# Patient Record
Sex: FEMALE | Race: WHITE | Employment: OTHER | URBAN - METROPOLITAN AREA
[De-identification: names, ages, dates, MRNs, and addresses within clinical notes are randomized per-mention and may not be internally consistent; named-entity substitution may affect disease eponyms.]

---

## 2017-04-12 PROBLEM — Z97.8 FOLEY CATHETER IN PLACE: Status: ACTIVE | Noted: 2017-04-12

## 2017-05-31 PROBLEM — R31.9 BLOOD IN THE URINE: Status: ACTIVE | Noted: 2017-05-07

## 2017-07-17 PROBLEM — R10.2 SUPRAPUBIC PRESSURE: Status: ACTIVE | Noted: 2017-07-17

## 2019-06-26 ENCOUNTER — ANESTHESIA EVENT (OUTPATIENT)
Dept: SURGERY | Age: 84
End: 2019-06-26
Payer: MEDICARE

## 2019-06-26 RX ORDER — CEPHALEXIN 500 MG/1
500 CAPSULE ORAL 2 TIMES DAILY
Status: ON HOLD | COMMUNITY
End: 2019-06-27 | Stop reason: ALTCHOICE

## 2019-06-26 NOTE — PERIOP NOTES
PAT - SURGICAL PRE-ADMISSION INSTRUCTIONS    NAME:  Wm Nguyen                                                          TODAY'S DATE:  6/26/2019    SURGERY DATE:  6/27/2019                                  SURGERY ARRIVAL TIME:   TBV    1. Do NOT eat or drink anything, including candy or gum, after MIDNIGHT on 6/26/19 , unless you have specific instructions from your Surgeon or Anesthesia Provider to do so. 2. No smoking on the day of surgery. 3. No alcohol 24 hours prior to the day of surgery. 4. No recreational drugs for one week prior to the day of surgery. 5. Leave all valuables, including money/purse, at home. 6. Remove all jewelry, nail polish, makeup (including mascara); no lotions, powders, deodorant, or perfume/cologne/after shave. 7. Glasses/Contact lenses and Dentures may be worn to the hospital.  They will be removed prior to surgery. 8. Call your doctor if symptoms of a cold or illness develop within 24 ours prior to surgery. 9. AN ADULT MUST DRIVE YOU HOME AFTER OUTPATIENT SURGERY. 10. If you are having an OUTPATIENT procedure, please make arrangements for a responsible adult to be with you for 24 hours after your surgery. 11. If you are admitted to the hospital, you will be assigned to a bed after surgery is complete. Normally a family member will not be able to see you until you are in your assigned bed. 15. Family is encouraged to accompany you to the hospital.  We ask visitors in the treatment area to be limited to ONE person at a time to ensure patient privacy. EXCEPTIONS WILL BE MADE AS NEEDED. 15. Children under 12 are discouraged from entering the treatment area and need to be supervised by an adult when in the waiting room. Special Instructions:    Bring inhaler. , Take these medications the morning of surgery with a sip of water:  METOPROLOL, LEVOTHYROXINE, PEPCID,, STOP anticoagulants AT LEAST 1 WEEK PRIOR to your surgery or, follow other MD instructions: XERALTO    Patient Prep:    shower with anti-bacterial soap    These surgical instructions were reviewed with PATIENT during the PAT PHONE CALL. Directions: On the morning of surgery, please go to the [facility]. Enter the building from the [facility] entrance, 1st floor (next to the Emergency Room entrance). Take the elevator to the 2nd floor. Sign in at the Registration Desk.     If you have any questions and/or concerns, please do not hesitate to call:  (Prior to the day of surgery)  [facility] unit:  [phone]  (Day of surgery)  [facility] unit:  [phone]

## 2019-06-27 ENCOUNTER — ANESTHESIA (OUTPATIENT)
Dept: SURGERY | Age: 84
End: 2019-06-27
Payer: MEDICARE

## 2019-06-27 ENCOUNTER — HOSPITAL ENCOUNTER (OUTPATIENT)
Age: 84
Setting detail: OUTPATIENT SURGERY
Discharge: HOME | End: 2019-06-27
Attending: UROLOGY | Admitting: UROLOGY
Payer: MEDICARE

## 2019-06-27 VITALS
HEART RATE: 66 BPM | OXYGEN SATURATION: 97 % | WEIGHT: 127.6 LBS | RESPIRATION RATE: 16 BRPM | HEIGHT: 62 IN | SYSTOLIC BLOOD PRESSURE: 170 MMHG | TEMPERATURE: 97.8 F | BODY MASS INDEX: 23.48 KG/M2 | DIASTOLIC BLOOD PRESSURE: 78 MMHG

## 2019-06-27 DIAGNOSIS — N32.81 OAB (OVERACTIVE BLADDER): Primary | ICD-10-CM

## 2019-06-27 PROCEDURE — 74011250636 HC RX REV CODE- 250/636: Performed by: NURSE ANESTHETIST, CERTIFIED REGISTERED

## 2019-06-27 PROCEDURE — 76060000031 HC ANESTHESIA FIRST 0.5 HR: Performed by: UROLOGY

## 2019-06-27 PROCEDURE — 74011250636 HC RX REV CODE- 250/636

## 2019-06-27 PROCEDURE — 77030018832 HC SOL IRR H20 ICUM -A: Performed by: UROLOGY

## 2019-06-27 PROCEDURE — 76210000022 HC REC RM PH II 1.5 TO 2 HR: Performed by: UROLOGY

## 2019-06-27 PROCEDURE — 77030034696 HC CATH URETH FOL 2W BARD -A: Performed by: UROLOGY

## 2019-06-27 PROCEDURE — 74011000272 HC RX REV CODE- 272: Performed by: UROLOGY

## 2019-06-27 PROCEDURE — 74011250636 HC RX REV CODE- 250/636: Performed by: UROLOGY

## 2019-06-27 PROCEDURE — 74011000250 HC RX REV CODE- 250: Performed by: UROLOGY

## 2019-06-27 PROCEDURE — 77030018823 HC SLV COMPR VENO -B: Performed by: UROLOGY

## 2019-06-27 PROCEDURE — 74011250637 HC RX REV CODE- 250/637: Performed by: NURSE ANESTHETIST, CERTIFIED REGISTERED

## 2019-06-27 PROCEDURE — 74011000258 HC RX REV CODE- 258: Performed by: UROLOGY

## 2019-06-27 PROCEDURE — 76210000063 HC OR PH I REC FIRST 0.5 HR: Performed by: UROLOGY

## 2019-06-27 PROCEDURE — 76010000159 HC OR TIME FIRST 0.5 HR INTENSV-TIER 1: Performed by: UROLOGY

## 2019-06-27 PROCEDURE — 74011000258 HC RX REV CODE- 258

## 2019-06-27 RX ORDER — FAMOTIDINE 20 MG/1
20 TABLET, FILM COATED ORAL ONCE
Status: COMPLETED | OUTPATIENT
Start: 2019-06-27 | End: 2019-06-27

## 2019-06-27 RX ORDER — DIPHENHYDRAMINE HYDROCHLORIDE 50 MG/ML
INJECTION, SOLUTION INTRAMUSCULAR; INTRAVENOUS
Status: COMPLETED
Start: 2019-06-27 | End: 2019-06-27

## 2019-06-27 RX ORDER — HYDROMORPHONE HYDROCHLORIDE 2 MG/ML
0.5 INJECTION, SOLUTION INTRAMUSCULAR; INTRAVENOUS; SUBCUTANEOUS
Status: DISCONTINUED | OUTPATIENT
Start: 2019-06-27 | End: 2019-06-28 | Stop reason: HOSPADM

## 2019-06-27 RX ORDER — DIPHENHYDRAMINE HYDROCHLORIDE 50 MG/ML
25 INJECTION, SOLUTION INTRAMUSCULAR; INTRAVENOUS ONCE
Status: COMPLETED | OUTPATIENT
Start: 2019-06-27 | End: 2019-06-27

## 2019-06-27 RX ORDER — SODIUM CHLORIDE, SODIUM LACTATE, POTASSIUM CHLORIDE, CALCIUM CHLORIDE 600; 310; 30; 20 MG/100ML; MG/100ML; MG/100ML; MG/100ML
25 INJECTION, SOLUTION INTRAVENOUS CONTINUOUS
Status: DISCONTINUED | OUTPATIENT
Start: 2019-06-27 | End: 2019-06-27 | Stop reason: HOSPADM

## 2019-06-27 RX ORDER — FENTANYL CITRATE 50 UG/ML
50 INJECTION, SOLUTION INTRAMUSCULAR; INTRAVENOUS AS NEEDED
Status: DISCONTINUED | OUTPATIENT
Start: 2019-06-27 | End: 2019-06-28 | Stop reason: HOSPADM

## 2019-06-27 RX ORDER — ONDANSETRON 2 MG/ML
4 INJECTION INTRAMUSCULAR; INTRAVENOUS ONCE
Status: DISCONTINUED | OUTPATIENT
Start: 2019-06-27 | End: 2019-06-27 | Stop reason: HOSPADM

## 2019-06-27 RX ORDER — LIDOCAINE HYDROCHLORIDE 20 MG/ML
INJECTION, SOLUTION EPIDURAL; INFILTRATION; INTRACAUDAL; PERINEURAL AS NEEDED
Status: DISCONTINUED | OUTPATIENT
Start: 2019-06-27 | End: 2019-06-27 | Stop reason: HOSPADM

## 2019-06-27 RX ORDER — PROPOFOL 10 MG/ML
INJECTION, EMULSION INTRAVENOUS AS NEEDED
Status: DISCONTINUED | OUTPATIENT
Start: 2019-06-27 | End: 2019-06-27 | Stop reason: HOSPADM

## 2019-06-27 RX ORDER — LIDOCAINE HYDROCHLORIDE 10 MG/ML
0.1 INJECTION, SOLUTION EPIDURAL; INFILTRATION; INTRACAUDAL; PERINEURAL AS NEEDED
Status: DISCONTINUED | OUTPATIENT
Start: 2019-06-27 | End: 2019-06-27 | Stop reason: HOSPADM

## 2019-06-27 RX ORDER — SODIUM CHLORIDE, SODIUM LACTATE, POTASSIUM CHLORIDE, CALCIUM CHLORIDE 600; 310; 30; 20 MG/100ML; MG/100ML; MG/100ML; MG/100ML
125 INJECTION, SOLUTION INTRAVENOUS CONTINUOUS
Status: DISCONTINUED | OUTPATIENT
Start: 2019-06-27 | End: 2019-06-28 | Stop reason: HOSPADM

## 2019-06-27 RX ADMIN — SODIUM CHLORIDE, SODIUM LACTATE, POTASSIUM CHLORIDE, AND CALCIUM CHLORIDE 25 ML/HR: 600; 310; 30; 20 INJECTION, SOLUTION INTRAVENOUS at 10:44

## 2019-06-27 RX ADMIN — SODIUM CHLORIDE 1000 MG: 900 INJECTION, SOLUTION INTRAVENOUS at 10:38

## 2019-06-27 RX ADMIN — FAMOTIDINE 20 MG: 20 TABLET, FILM COATED ORAL at 10:35

## 2019-06-27 RX ADMIN — SODIUM CHLORIDE, SODIUM LACTATE, POTASSIUM CHLORIDE, AND CALCIUM CHLORIDE 125 ML/HR: 600; 310; 30; 20 INJECTION, SOLUTION INTRAVENOUS at 12:06

## 2019-06-27 RX ADMIN — PROPOFOL 100 MG: 10 INJECTION, EMULSION INTRAVENOUS at 11:03

## 2019-06-27 RX ADMIN — DIPHENHYDRAMINE HYDROCHLORIDE 25 MG: 50 INJECTION, SOLUTION INTRAMUSCULAR; INTRAVENOUS at 12:06

## 2019-06-27 RX ADMIN — MEPERIDINE HYDROCHLORIDE 12.5 MG: 100 INJECTION, SOLUTION INTRAMUSCULAR; INTRAVENOUS; SUBCUTANEOUS at 12:17

## 2019-06-27 RX ADMIN — LIDOCAINE HYDROCHLORIDE 50 MG: 20 INJECTION, SOLUTION EPIDURAL; INFILTRATION; INTRACAUDAL; PERINEURAL at 11:03

## 2019-06-27 RX ADMIN — GENTAMICIN SULFATE 250.4 MG: 40 INJECTION, SOLUTION INTRAMUSCULAR; INTRAVENOUS at 11:00

## 2019-06-27 NOTE — H&P
Arcelia Mendez presents today for Cystoscopy Botox Injection 200IU  Consent secured, questions answered. Urine culture positive for E. Coli pansensitve  Vanco and Gentamicin preop OCTOR      .  She is a 80 y.o.  female with a history of UUI, refractory OAB, and PFD managed with Bladder Botox injections (last 1/29/19) and SPT.      She was just recently treated with Macrobid for E coli UTI on 4/4/19. Her most bothersome symptom today is leakage around the SPT. Denies flank pain, gross hematuria, dysuria, asymptomatic for infection. No f/c/n/v.   She does say she is having trouble with her tube draining     Prior History  3/14/19: Patient has noted leakage around SPT site despite Botox injections. She is frustrated as this will soak her clothing and a foul odor. She discontinued Sanctura 2/2 dry mouth. Recalls trying Myrbetriq 50mg but cannot remember if it provided benefit. Requesting home health to start performing SPT exchanges monthly. Denies flank pain, gross hematuria, dysuria and is asymptomatic for infection today. No f/c/n/v.      12/21/18: Patient here today c/o decreased urine output and urine leakage from her vagina. Continues Myrbetriq 50mg for her bladder spasms,however she is scheduled to have Botox injections. On UTI prophylaxis with Keflex 250mg daily and has irrigated 3 times since her last visit with Dr. Kadie Asher. She also has itching in her vagina today. No f/c/n/v.      Patient was unable tolerate cystoscopy to deliver Botox injections. SPT changed today. To OR for cysto and bladder capacity measurement under anesthesia; goal is to consider BN closure.      11/16/18: Patient is here for a SPT change, and reports increased leak w/ occasional gross hematuria. However, she began using a belly bag when she is active and this has improved her QOL significantly. She has finished her UTI prophylaxis. No s/s of UTI today. No n/v/f/fc.    Area of irritation and dermatomycosis on low abdomen has improved with Nystatin BID and fluconazole 150mg x3.      9/7/2018: Patient has been doing relatively well since last. No symptomatic UTIs. She c/o leak around SPT and a rash developing above site. She continues on Keflex prophylaxis and Myrbetriq 50mg. Bladder spasms under better control. No gross hematuria or flank pain. No n/v/f/c. Overall, doing well.      7/16/2018: Patient has been scheduled for bladder neck closure with martius flap and bladder Botox injections on 7/27/2018 due to persistent bothersome incontinence. However, today states that she is taking her Myrbetriq 50mg consistently and her incontinence per urethra has improved significantly. She is still having some leakage around SPT, but this is not as bothersome to her. Last month she was treated for a persistent UTI with two courses of ABX, and believes that this has also helped her bladder spasms. She is wanting to post-pone her surgery and observe her symptoms. No dysuria or gross hematuria. No n/v/f/c. Still does not like leg bag.      6/27/2018: Patient has been seen in office several times over the last few months with SPT issues, bladder spasms, recurrent UTIs, and constant incontinence. She has been scheduled for Cysto, bladder neck closure w/ martius flap, and Bladder Botox injections 200 units on 7/27/2018. But, it has been discussed that she will have the option to move forward with a urinary diversion if no benefit with less invasive surgery (BN closure). She is still unsure of what option will be better for her, and has several questions. She has been having severe bladder spasms this week, and is leaking constantly through urethra. Patient states that her QOL is very limited, and she can't leave the house without soaking her clothing. She was given Macrobid earlier this week for suspected UTI. No n/v/f/c.      6/12/2018: Patient was last seen for an SPT change on 6/4/2018.  Patient has had persistent incontinence around SPT site and urethra. She struggles with rashes around her abdomen, and she is unsure if this is macerated skin or allergies. She is considering alternative surgical options, and presents today with daughter to discuss. No s/s of UTI. Catheter is draining well, but she has noticed some blood clots in her leg bag. No n/v/f/c.      5/23/2018: Had Botox injection last on 3/30/18 and is pleased with results. Seen in office for irrigation on 5/16/18. Still experiencing leakage around SPT site and occasionally from urethra as well. Last changed on 5/7/18. Denies flank pain, gross hematuria, and is asymptomatic for infection today. No f/c/n/v.       3/30/18: Here today for 200u Botox injections. Pt was started on Levaquin 2 days ago for E coli UTI, but has been experiencing SE nausea. She was sent a prescription of Zofran by Dr. Rodney Diaz, however she has not taken it. Concerned that she may still have infection today. Has upcoming appointment with ID to discuss frequent UTI. No f/c/n/v.      3/2/2018: Patient was last treated for UTI in January. She had a \"horrible\" back ache, confusion, and malodorous urine. Symptoms have resolved following a course of Doxycyline. She is having some leakage around catheter and through urethra. No gross hematuria. No n/v/f/c.      11/29/2017: Patient was recently admitted to the hospital on 11/15/2017 for a CHF which was resolved with diuresis and e E faecalis CAUTI resolved by IV ABX. She was discharged home but was readmitted to hospital on 11/23/2017 for concern of pneumonia but was diagnosed with with acute bronchitis. Urine culture was negative during second admission. Her SPT is draining appropriately. Yellow urine in leg bag. Given recent CHF diagnosis she has been placed on lasix and has noticed increase incontinence around catheter at night. Continues  irrigations MWF.  No n/v/f/c.      5/31/2017: Here today s/p cysto, 200 units of bladder Botox injections, and SPT placement on 5/2/2017. Postoperative course complicated by gross hematuria requiring readmittance for CBI. She was also seen in the emergency room for concern of minor cellulitis around SPT site and was placed on Keflex. Patient continues to c/o persistent erythema around SPT site and states that she was missing doses of her ABX. She denies gross hematuria. No n/v/f/c.      2/17/2017: Presents today in follow up. She is s/p bladder Botox injections on 7/20/2016 and manages with a chronic Rodriguez catheter. She continues on Myrbetriq 50mg daily for bladder spasms, but feels that Botox and Myrbetriq have become less effective recently and she feels that it is time to schedule next course of Botox. She has been experiencing increased leakage around catheter. She is managing leakage well with pads, but bladder spasms are very painful. No hematuria. No n/v/f/c. Additionally mentions that she is no longer performing bladder irrigations MWF because her daughter has not been able to help her. Interested in [facility] if she qualifies.      11/18/2016: Presents today in follow up. She is s/p bladder Botox injections on 7/20/2016 and manages with a chronic Rodriguez catheter.  She continues on Myrbetriq 50 mg/ bladder spasms. She has been treated for 2 UTI's since last visit. With infections she experiences fransisco catheter leakage and cloudy urine. Cloudy urine improved after course of ABx, but still struggling with fransisco cath leakage. She would like to discuss UTI management. Denies hematuria, flank pain, or AMS.    8/29/2016:She is s/p bladder Botox injections on 7/20/2016. She continues management with Rodriguez catheter (last changed on 8/16/16) and is taking Toviaz 8mg daily. She reports intolerable dry mouth with Miri Carton would like to discuss Myrbetriq. Still has leakage per urethra around catheter with occasional bladder spasms, but improved with Botox. Reports 2 episodes. She is plugging Rodriguez and emptying bladder q 1 hour.  Currently begin treated for UTI. Last dose of Macrobid tonight. No n/v/f/c.      5/16/2016 Patient is considering Botox injecitons, however, she is unable to preform CIC 2/2 arthritis. Leakage is still very bothersome. Continues to wear 5-6 PPD. She is considering a permanent Rodriguez to control leakage, specifically, in social situations. Denies UTI sx today.      4/11/2016: Since ladst visit she has finished a trial of Myrbetriq 50mg with minimal benefit. She reports significant dry mouth and \"sores\". Her UUI is significantly effecting QOL. She is interested in more invasive therapies; mainly interested in bladder Botox injections. She has never performed CIC before but is willing to learn in event of retention. No dysuria, hematuria, or recent UTI. She did PTNS with Dr. Giovanny Betts in the past.   History of a-fib; on anticoagulation. Has been able to bridge for dental procedures in the past.      2/23/2016:Estephanie Merritt is a 80 y.o. female Miles Blakely presents for follow up of UUI. Patient has completed 6 sessions of PT. Her constipation issues resolved, however she has not experienced any improvement from a urinary stand point. Still uses up 6 pads a day which are completely soaked. She is no longer interested in continuing with physical therapy and would like to try something else.      11/24/2015:Estephanie Merritt is a 80 y.o. female Miles Blakely presents for follow up of severe UUI. On last visit she was given samples of myrbetriq, which she stopped because it raised her blood pressure. UDS examination revealed a low capacity compliant bladder with DO and unrelaxed sphincter. On PT she was found to have pelvic floor muscle dysfunction. She is still having frequent incontinence episodes, especially during the night, and complains of severe constipation.       11/2/2015: She has not been evaluated since 2013. Since last visit she has been seeing Dr. Giovanny Betts, urogynocology and recently underwent cystoscopy as part of a work up for interstim placement. Her cystoscopy was revealed highly trabeculated bladder and several cellules. She would like to discuss this today. She states that she \"never\" makes it to the bathroom with initial urge. She wears several heavy pads throughout the day and night. She has constant bladder pain with voiding. She voids with continuous, good FOS. Frequently experiences PVR sensation and will double void with crede or valsalva. She notes enuresis until she was a teenager and dyspareunia when she was sexually active. She has also h/o diverticulitis and has trouble with regulating bowel movements. She will have constipation and then experience diarrhea after taking laxitives. During episodes of diarrhea she will experience occasional fecal incontinence. Has failed PTNS in the past.      2013:Has finished PT and had significant improvement in symptoms. She had relief in suprapubic pressure and voids better. Happy with results. Here for follow up. Had vaginal itching with estrace, hence she discontinue.      2013:  history of frequency every 1-2 hours, urgency, UUI (wears a pad); occasional ROBERTO. Also complains of nocturia X 3-4. She complains of a suprapubic pressure that relieves after voiding. The symptoms have been present for about two months, but had episodes in the past. Intensity has progressed since onset. Episodes interpreted as UTI's and managed with different antibiotics; has tried 3 types with partial success in relieving symptoms. Good FOS, feels empty after finishing, no hesitancy. No dysuria, no hematuria. She drinks 80 ounces of water, one coffee in the morning and a soda at night. Irregular bowel movement, uses [employer] to help with constipation.    A1 (tubed ectopic pregnancy) vaginal delivery; no hysterectomy.             Current Outpatient Medications   Medication Sig Dispense Refill    Syringe, Disposable, (BD SYRINGE CATHETER TIP) 60 mL syrg 1 syringe PRN for bladder irriagtions 30 Syringe 11    ciprofloxacin HCl (CIPRO) 500 mg tablet Take 1 Tab by mouth two (2) times a day for 7 days. 14 Tab 0    PROAIR HFA 90 mcg/actuation inhaler inhale 2 puffs by mouth every 4 hours if needed for cough shortness of breath or wheezing   0    AEROCHAMBER MAX WITH FLOW-VU use as directed with INHALER   0    nitrofurantoin, macrocrystal-monohydrate, (MACROBID) 100 mg capsule Take 1 Cap by mouth two (2) times a day. 20 Cap 0    mirabegron ER (MYRBETRIQ) 50 mg ER tablet Take 1 Tab by mouth daily. 90 Tab 3    ondansetron (ZOFRAN ODT) 4 mg disintegrating tablet Take 1 Tab by mouth every eight (8) hours as needed for Nausea. 20 Tab 0    mirabegron ER (MYRBETRIQ) 50 mg ER tablet Take 1 Tab by mouth daily. 30 Tab 4    ondansetron hcl (ZOFRAN) 4 mg tablet Take 1 Tab by mouth every eight (8) hours as needed for Nausea. 15 Tab 0    clobetasol (TEMOVATE) 0.05 % ointment Apply  to affected area two (2) times a day. Apply to affected area 2x daily x 4 weeks. 15 g 2    metoprolol tartrate (LOPRESSOR) 100 mg IR tablet 100 mg.        nystatin (MYCOSTATIN) topical cream Apply  to affected area two (2) times a day. 15 g 0    atorvastatin (LIPITOR) 10 mg tablet          calcium-cholecalciferol, D3, (CALTRATE 600+D) tablet          sodium chloride 0.9 % soln 60 mL by IntraVESical route every other day. 5000 mL 2    metoprolol succinate (TOPROL-XL) 100 mg tablet     0    furosemide (LASIX) 20 mg tablet     0    famotidine (PEPCID) 20 mg tablet     0    KLOR-CON M10 10 mEq tablet          Syringe with Needle, Safety (EASY TOUCH FLIPLOCK SYRINGE) 3 mL 21 gauge x 1 1/2\" syrg 1 Syringe by Does Not Apply route every seven (7) days. 4 Pen Needle 12    Denosumab (PROLIA) 60 mg/mL injection 60 mg.        rivaroxaban (XARELTO) 15 mg tab tablet Take  by mouth daily.        levothyroxine (SYNTHROID) 25 mcg tablet Take  by mouth Daily (before breakfast).         ergocalciferol (VITAMIN D2) 50,000 unit capsule Take 50,000 Units by mouth.               Past Medical History:   Diagnosis Date    A-fib ([facility]      Arthritis      B12 deficiency      Bladder atony      Bladder spasms      Cardiac arrhythmia      Cardiac disorder      Cerebral artery occlusion with cerebral infarction (HCC)      Cerebral infarction (HCC)      Chronic suprapubic catheter (HCC)      Congestive heart failure (CHF) ([facility] 11/2017    Diverticulum of bladder      Esophageal reflux      Rodriguez catheter in place 4/12/2017    Frequency of micturition      Gross hematuria      Heart defect, congenital      Hematuria      HTN (hypertension)      Hyperlipemia      Hyperthyroidism      Hypothyroid      Incontinence of feces      Memory loss      Nocturia      OAB (overactive bladder)      Osteopenia      PFD (pelvic floor dysfunction)      Recurrent UTI      Stroke (HCC)      Suprapubic pressure      Urge incontinence      Urge incontinence of urine      Urinary retention      UTI (urinary tract infection)      Vaginal atrophy      Vitamin D deficiency        /psh        Allergies   Allergen Reactions    Latex Rash    Aspirin Other (comments)       Does not take because she is on other blood thinners    Ciprofloxacin Other (comments)       Nausea, vomiting, sores in mouth    Codeine Nausea and Vomiting       Patient can not remember    Penicillins Other (comments)    Pradaxa [Dabigatran Etexilate] Other (comments)       Breaks out sores    Sulfa (Sulfonamide Antibiotics) Nausea Only    Trospium Nausea and Vomiting and Other (comments)       Dry mouth and sores in the mouth         Social History            Tobacco Use    Smoking status: Never Smoker    Smokeless tobacco: Never Used   Substance Use Topics    Alcohol use: No       Comment: socially wine or beer    Drug use:  No            Family History   Problem Relation Age of Onset    Diabetes Mother     Tony Larson Arthritis-rheumatoid Sister      Diabetes Sister        Review of Systems  Constitutional: Fever: No  has chills  Skin: Rash: No  HEENT: Hearing difficulty: No  Eyes: Blurred vision: No  Cardiovascular: Chest pain: No  Respiratory: Shortness of breath: No  Gastrointestinal: Nausea/vomiting: No  Musculoskeletal: Back pain: Yes  Neurological: Weakness: Yes  Psychological: Memory loss: No  Comments/additional findings:         Visit Vitals  Ht 5' 3\" (1.6 m)   Wt 113 lb (51.3 kg)   BMI 20.02 kg/m²      Abdomen soft SP tube in place           Results for orders placed or performed in visit on 05/21/19   URINE C&S   Result Value Ref Range     FINAL REPORT Microbiology results (A)           Procedure:  A 10 cc syringe was attached to the old catheter port and drawn back to deflate the balloon. The catheter was removed. Using sterile technique lubricant was applied liberally to the first two inches of the new catheter tip and inserted gently to the same level as the previous catheter. The catheter was held and the balloon was inflated slowly with 10cc of sterile water . The syringe was removed from the catheter port. A new, clean drainage bag was attached. Patient is aware that there may be some slight bleeding from the tube site and that a small amount of bleeding is normal.   Patient was advised to contact our office if problems arise.      Catheter size:  16  Type:  Straight-tip   Material: Silicone     Urine was:   Dark and cloudy     Specimen was obtained:   Yes  Drainage bag:   bedside     Patient been set up with the [address] [place] [address] program:  YES     Patient did not bring their own catheter supplies.          Encounter Diagnoses   Name Primary?     Urinary retention Yes    Chronic suprapubic catheter ([address]                 Orders Placed This Encounter    SP Tube Change    URINE C&S       Order Specific Question:   Specify all ANTIBIOTIC ALLERGIES:       Answer:   Penicillin       Comments:   Per patient she is not allergic to Cipro       Order Specific Question:   Specify all ANTIBIOTIC ALLERGIES:       Answer:   Sulfa (Bactrim)       Order Specific Question:   Specify the urine source       Answer:   Suprapubic Tube    PROAIR HFA 90 mcg/actuation inhaler       Sig: inhale 2 puffs by mouth every 4 hours if needed for cough shortness of breath or wheezing       Refill:  0    AEROCHAMBER MAX WITH FLOW-VU       Sig: use as directed with INHALER       Refill:  0          Date of Surgery Update:  Em Armstrong was seen and examined. History and physical has been reviewed. The patient has been examined.  There have been no significant clinical changes since the completion of the originally dated History and Physical.    Signed By: Roberto Carlos Morfin MD     June 27, 2019 10:49 AM

## 2019-06-27 NOTE — DISCHARGE INSTRUCTIONS
Patient Education        Cystoscopy: What to Expect at [facility]    A cystoscopy is a procedure that lets a doctor look inside of the bladder and the urethra. The urethra is the tube that carries urine from the bladder to outside the body. The doctor uses a thin, lighted tool called a cystoscope. Your bladder is filled with fluid. This stretches the bladder so that your doctor can look closely at the inside of your bladder. After the cystoscopy, your urethra may be sore at first, and it may burn when you urinate for the first few days after the procedure. You may feel the need to urinate more often, and your urine may be pink. These symptoms should get better in 1 or 2 days. You will probably be able to go back to most of your usual activities in 1 or 2 days. This care sheet gives you a general idea about how long it will take for you to recover. But each person recovers at a different pace. Follow the steps below to get better as quickly as possible. How can you care for yourself at home? Activity    · Rest when you feel tired. Getting enough sleep will help you recover.     · Try to walk each day. Start by walking a little more than you did the day before. Bit by bit, increase the amount you walk. Walking boosts blood flow and helps prevent pneumonia and constipation.     · Avoid strenuous activities, such as bicycle riding, jogging, weight lifting, or aerobic exercise, until your doctor says it is okay.     · Ask your doctor when you can drive again.     · Most people are able to return to work within 1 or 2 days after the procedure.     · You may shower and take baths as usual.     · Ask your doctor when it is okay for you to have sex. Diet    · You can eat your normal diet. If your stomach is upset, try bland, low-fat foods like plain rice, broiled chicken, toast, and yogurt.     · Drink plenty of fluids (unless your doctor tells you not to).    Medicines    · Take pain medicines exactly as directed. ? If the doctor gave you a prescription medicine for pain, take it as prescribed. ? If you are not taking a prescription pain medicine, ask your doctor if you can take an over-the-counter medicine.     · If you think your pain medicine is making you sick to your stomach:  ? Take your medicine after meals (unless your doctor has told you not to). ? Ask your doctor for a different pain medicine.     · If your doctor prescribed antibiotics, take them as directed. Do not stop taking them just because you feel better. You need to take the full course of antibiotics. Follow-up care is a key part of your treatment and safety. Be sure to make and go to all appointments, and call your doctor if you are having problems. It's also a good idea to know your test results and keep a list of the medicines you take. When should you call for help? Call 911 anytime you think you may need emergency care. For example, call if:    · You passed out (lost consciousness).     · You have severe trouble breathing.     · You have sudden chest pain and shortness of breath, or you cough up blood.     · You have severe belly pain.    Call your doctor now or seek immediate medical care if:    · You are sick to your stomach or cannot keep fluids down.     · Your urine is still red or you see blood clots after you have urinated several times.     · You have trouble passing urine or stool, especially if you have pain or swelling in your lower belly.     · You have signs of a blood clot, such as:  ? Pain in your calf, back of the knee, thigh, or groin. ? Redness and swelling in your leg or groin.     · You develop a fever or severe chills.     · You have pain in your back just below your rib cage. This is called flank pain.    Watch closely for changes in your health, and be sure to contact your doctor if:    · You have pain or burning when you urinate.  A burning feeling is normal for a day or two after the test, but call if it does not get better.     · You have a frequent urge to urinate but can pass only small amounts of urine.     · Your urine is pink, red, or cloudy, or smells bad. It is normal for the urine to have a pinkish color for a few days after the test, but call if it does not get better. Where can you learn more? Go to http://curt-ramiro.info/. Enter N694 in the search box to learn more about \"Cystoscopy: What to Expect at Home. \"  Current as of: March 20, 2018  Content Version: 11.9  © 0611-5320 [employer]. Care instructions adapted under license by [employer] (which disclaims liability or warranty for this information). If you have questions about a medical condition or this instruction, always ask your healthcare professional. [address] any warranty or liability for your use of this information. Patient Education        Learning About Urinary Catheter Care to Prevent Infection  What is a urinary catheter? A urinary catheter is a flexible plastic tube used to drain urine from your bladder when you can't urinate on your own. The catheter allows urine to drain from the bladder into a bag. Two types of drainage bags may be used with a urinary catheter. · A bedside bag is a large bag that you can hang on the side of your bed or on a chair. You can use it overnight or anytime you will be sitting or lying down for a long time. · A leg bag is a small bag that you can use during the day. It is usually attached to your thigh or calf and hidden under your clothes. Having a urinary catheter increases your risk of getting a urinary tract infection. Germs may get on the catheter and cause an infection in your bladder or kidneys. The longer you have a catheter, the more likely it is that you will get an infection. You can help prevent this problem with good hygiene and careful handling of your catheter and drainage bags.   How can you help prevent infection? Take care to be clean  · Always wash your hands well before and after you handle your catheter. · Clean the skin around the catheter twice a day using soap and water. Dry with a clean towel afterward. You can shower with your catheter and drainage bag in place unless your doctor told you not to. · When you clean around the catheter, check the surrounding skin for signs of infection. Look for things like pus or irritated, swollen, red, or tender skin around the catheter. Be careful with your drainage bag  · Always keep the drainage bag below the level of your bladder. This will help keep urine from flowing back into your bladder. · Check often to see that urine is flowing through the catheter into the drainage bag. · Empty the drainage bag when it is half full. This will keep it from overflowing or backing up. · When you empty the drainage bag, do not let the tubing or drain spout touch anything. Be careful with your catheter  · Do not unhook the catheter from the drain tube. That could let germs get into the tube. · Make sure that the catheter tubing does not get twisted or kinked. · Do not tug or pull on the catheter. And make sure that the drainage bag does not drag or pull on the catheter. · Do not put powder or lotion on the skin around the catheter. · Talk with your doctor about your options for sexual intercourse while wearing a catheter. How do you empty a urine drainage bag? If your doctor has asked you to keep a record, write down the amount of urine in the bag before you empty it. Wash your hands before and after you touch the bag. 1. Remove the drain spout from its sleeve at the bottom of the drainage bag.  2. Open the valve on the drain spout. Let the urine flow out into the toilet or a container. Be careful not to let the tubing or drain spout touch anything. 3. After you empty the bag, close the valve.  Then put the drain spout back into its sleeve at the bottom of the collection bag. How do you add a bedside bag to a leg bag? Wash your hands before and after you handle the bags. 1. Empty the leg bag attached to the catheter. 2. Put a clean towel under the leg bag.  3. Use an alcohol wipe to clean the tip of the bedside bag. Then connect the bedside bag to the leg bag. How can you clean a bedside drainage bag? Many people clean their bedside bag in the morning if they switch to a leg bag. To clean a bedside drainage ba. Remove the bedside bag from the leg bag.  2. Fill the bag with 2 parts vinegar and 3 parts water. Let it stand for 20 minutes. 3. Empty the bag, and let it air dry. When should you call for help? Call your doctor now or seek immediate medical care if:  · You have symptoms of a urinary infection. These may include:  ? Pain or burning when you urinate. ? A frequent need to urinate without being able to pass much urine. ? Pain in the flank, which is just below the rib cage and above the waist on either side of the back. ? Blood in your urine. ? A fever. · Your urine smells bad. · You see large blood clots in your urine. · No urine or very little urine is flowing into the bag for 4 or more hours. Watch closely for changes in your health, and be sure to contact your doctor if:  · The area around the catheter becomes irritated, swollen, red, or tender, or there is pus draining from it. · Urine is leaking from the place where the catheter enters your body. Follow-up care is a key part of your treatment and safety. Be sure to make and go to all appointments, and call your doctor if you are having problems. It's also a good idea to know your test results and keep a list of the medicines you take. Where can you learn more? Go to http://curt-ramiro.info/. Enter U010 in the search box to learn more about \"Learning About Urinary Catheter Care to Prevent Infection. \"  Current as of: 2018  Content Version: 11.9  © 3754-5648 [employer]. Care instructions adapted under license by [employer] (which disclaims liability or warranty for this information). If you have questions about a medical condition or this instruction, always ask your healthcare professional. [address] any warranty or liability for your use of this information. DISCHARGE SUMMARY from Nurse    PATIENT INSTRUCTIONS:    After general anesthesia or intravenous sedation, for 24 hours or while taking prescription Narcotics:  · Limit your activities  · Do not drive and operate hazardous machinery  · Do not make important personal or business decisions  · Do  not drink alcoholic beverages  · If you have not urinated within 8 hours after discharge, please contact your surgeon on call. Report the following to your surgeon:  · Excessive pain, swelling, redness or odor of or around the surgical area  · Temperature over 100.5  · Nausea and vomiting lasting longer than 4 hours or if unable to take medications  · Any signs of decreased circulation or nerve impairment to extremity: change in color, persistent  numbness, tingling, coldness or increase pain  · Any questions    What to do at Home:  Recommended activity: Activity as tolerated and no driving for today,     These are general instructions for a healthy lifestyle:    No smoking/ No tobacco products/ Avoid exposure to second hand smoke  Surgeon General's Warning:  Quitting smoking now greatly reduces serious risk to your health.     Obesity, smoking, and sedentary lifestyle greatly increases your risk for illness    A healthy diet, regular physical exercise & weight monitoring are important for maintaining a healthy lifestyle    You may be retaining fluid if you have a history of heart failure or if you experience any of the following symptoms:  Weight gain of 3 pounds or more overnight or 5 pounds in a week, increased swelling in our hands or feet or shortness of breath while lying flat in bed. Please call your doctor as soon as you notice any of these symptoms; do not wait until your next office visit. The discharge information has been reviewed with the patient. The patient verbalized understanding. Discharge medications reviewed with the patient and appropriate educational materials and side effects teaching were provided. ___________________________________________________________________________________________________________________________________    Patient armband removed and given to patient to take home.   Patient was informed of the privacy risks if armband lost or stolen

## 2019-06-27 NOTE — ANESTHESIA PREPROCEDURE EVALUATION
Relevant Problems   No relevant active problems       Anesthetic History   No history of anesthetic complications            Review of Systems / Medical History  Patient summary reviewed and pertinent labs reviewed    Pulmonary  Within defined limits                 Neuro/Psych       CVA  TIA and psychiatric history     Cardiovascular    Hypertension      CHF: orthopnea  Dysrhythmias : atrial fibrillation  CAD    Exercise tolerance: <4 METS  Comments: 2 pillow orthopnea    GI/Hepatic/Renal     GERD           Endo/Other      Hypothyroidism  Arthritis     Other Findings            Physical Exam    Airway  Mallampati: I  TM Distance: 4 - 6 cm  Neck ROM: normal range of motion   Mouth opening: Normal     Cardiovascular    Rhythm: regular  Rate: normal         Dental    Dentition: Poor dentition and Lower partial plate  Comments: Partial denture removed.     Pulmonary  Breath sounds clear to auscultation               Abdominal  GI exam deferred       Other Findings            Anesthetic Plan    ASA: 3  Anesthesia type: general          Induction: Intravenous  Anesthetic plan and risks discussed with: Patient

## 2019-06-27 NOTE — ANESTHESIA POSTPROCEDURE EVALUATION
Procedure(s):  CYSTOSCOPY WITH bladder botox injections 200 units    . general    Anesthesia Post Evaluation      Multimodal analgesia: multimodal analgesia used between 6 hours prior to anesthesia start to PACU discharge  Patient location during evaluation: bedside  Patient participation: complete - patient participated  Level of consciousness: awake  Pain management: adequate  Airway patency: patent  Anesthetic complications: no  Cardiovascular status: acceptable  Respiratory status: acceptable  Hydration status: acceptable  Post anesthesia nausea and vomiting:  controlled      Vitals Value Taken Time   /90 6/27/2019 11:45 AM   Temp 36.6 °C (97.8 °F) 6/27/2019 11:25 AM   Pulse 66 6/27/2019 11:47 AM   Resp 23 6/27/2019 11:47 AM   SpO2 97 % 6/27/2019 11:47 AM   Vitals shown include unvalidated device data.

## 2019-06-27 NOTE — PERIOP NOTES
Phase 2 Recovery Summary  Patient arrived to Phase 2 at 1152  Report received from Kavita Stevenson:    06/27/19 1135 06/27/19 1140 06/27/19 1145 06/27/19 1155   BP: 164/71 150/62 148/90 151/80   Pulse: 67 61 65 70   Resp: 21 17 16 16   Temp:       SpO2: 97% 98% 98% 93%   Weight:       Height:           oriented to time, place, person and situation    Lines and Drains  Peripheral Intravenous Line:   Peripheral IV 06/27/19 Left;Posterior Forearm (Active)   Site Assessment Clean, dry, & intact 6/27/2019 12:06 PM   Phlebitis Assessment 0 6/27/2019 12:06 PM   Infiltration Assessment 0 6/27/2019 12:06 PM   Dressing Status Clean, dry, & intact 6/27/2019 12:06 PM   Dressing Type Tape;Transparent 6/27/2019 12:06 PM   Hub Color/Line Status Pink; Infusing 6/27/2019 12:06 PM   Action Taken Open ports on tubing capped 6/27/2019 11:25 AM   Alcohol Cap Used Yes 6/27/2019 12:06 PM       Patient arrived to phase 2 from PACU. Vancomycin still running. Vital signs taken. Patient stated back was uncomfortable. Repositioned patient. Family brought back to recovery room. Patient stated was becoming itchy. Contacted anesthesia and ordered benadryl. Monitoring patient. Patient began having shakes. Contacted anesthesia and ordered demerol. Monitoring patient. Shakes and itching subsiding. Rechecked BP manually. Continuing to monitor patient. Patient ambulated from bed to chair with minimal assistance. Resting in recliner comfortably. Patient stated she felt ready to go home. Reviewed discharge instructions with family and patient. IV removed and patient allowed to get dressed. Patient transported to vehicle via wheelchair.        Patient discharged to home with Daughter, Jamila Del Rio  at [address]

## 2019-06-27 NOTE — PERIOP NOTES
Pre-Op Summary    Pt arrived via car with family/friend and is oriented to time, place, person and situation. Patient with unsteady gait with none assistive devices. Visit Vitals  BP (!) 162/93 (BP 1 Location: Left arm, BP Patient Position: At rest)   Pulse 63   Temp 97.7 °F (36.5 °C)   Resp 16   Ht 5' 2\" (1.575 m)   Wt 57.9 kg (127 lb 9.6 oz)   SpO2 97%   BMI 23.34 kg/m²       Peripheral IV located on Left forearm. Patients belongings are located with [facility]. Patient's point of contact is daughter, Jeniffer Wallace, and their contact number is: [phone]. They will be in the waiting room. They are able to receive medication information. They will be their ride home.

## 2019-06-27 NOTE — PERIOP NOTES
[facility] care of patient upon arrival to PACU. Patient awake and talking. Placed on monitor x3. VSS. Denies pain. 1140  POC (daughter) updated. In surgical waiting area. Patient complained of itching and \"tingling\" feeling in her back and upper arms. She was shivering on the stretcher. Anesthesia paged. Patient wrapped in warm blankets. She received Benadryl and Demerol. Denies shortness of breath, chest pain, palpitations, or nausea. She remained afebrile, with stable vital signs. No rash or swelling noted. Daughters at bedside with RN.    1300  Patient is resting on stretching. Itching and tingling has stopped. She continues to shiver intermittently but does state the room is \"freezing\". No acute distress noted.

## 2019-06-27 NOTE — OP NOTES
Operative Note    6/27/2019    Patient: Kaveh Cross               Sex: female             MRN: [id number]      YOB: 1932      Age:  80 y.o. Preoperative Diagnosis: urinary retention  r33.9    Postoperative Diagnosis:  * No post-op diagnosis entered *    Surgeon: Surgeon(s) and Role:     * Chantal Rogers MD - Primary     * Saw Sr MD - Fellow    Assistant:  MD VICTORINO Cheema Fellow  Circ-1: Osiel Aquino RN  Circ-2: Stephen GARCIA  Circ-Relief: Zita Hewitt RN  Scrub Tech-1: Kelle Bodily    Anesthesia:  General    Indications for surgery:     Procedure:  Procedure(s):  CYSTOSCOPY WITH bladder botox injections 200 units; change SP tube     Procedure in Detail:   The patient was identified and surgical site verification was performed prior to obtaining consent. The patient was brought to the cystoscopy suite. Under adequate LMA. the patient was positioned in dorsal lithotomy and prepped and draped as usual  A preoperative time out was performed addressing the anticipated surgical site, procedure, and safety precautions. Sequential compression stockings were applied. Prophylactic antibiotic (Gentamicin and Vancomycin) was administered empirically. A cystoscopy was performed. The anterior urethra appeared normal. The bladder was inspected. There was no evidence of any bladder stones, tumors or lesions. 200 units of Botox were obtained. This was mixed with 10 cc of normal saline  and then it was injected throughout the bladder in 10 sites. One  cc was injected at each site. The patient's bladder was drained. There was no evidence of any significant bleeding. She was awakened from anesthesia and taken to the recovery room in stable condition. previous SP tube was roved and a new catheter placed without difficulty. The patient was awakened and taken to the recovery room in stable condition.      Estimated Blood Loss:  nil Implants: * No implants in log *    Specimens: * No specimens in log *     Drains: SPT 16Fr. Complications:  None           Counts: Sponge and needle counts were correct times two.     Plan: Ms. Kaveh Cross has an appointment to see me in one week  for early postop follow-up, at [address] [place] [address]. Valarie Milton, [facility]    Yarely Ayala MD  6/27/2019      I performed the above procedure  Gerber Evans MD  [facility]  A Division of [facility] [place]

## 2019-11-04 PROBLEM — I50.33 ACUTE ON CHRONIC DIASTOLIC HEART FAILURE (HCC): Status: ACTIVE | Noted: 2017-11-18

## 2019-11-04 PROBLEM — R06.00 DYSPNEA: Status: ACTIVE | Noted: 2017-11-23

## 2019-11-04 PROBLEM — B95.2 ENTEROCOCCUS FAECALIS INFECTION: Status: ACTIVE | Noted: 2017-11-18

## 2019-11-04 PROBLEM — I50.9 CHF EXACERBATION (HCC): Status: ACTIVE | Noted: 2017-11-14

## 2019-11-04 PROBLEM — J18.9 PNEUMONIA DUE TO ORGANISM: Status: ACTIVE | Noted: 2017-11-22

## 2020-03-10 NOTE — PERIOP NOTES
PAT - SURGICAL PRE-ADMISSION INSTRUCTIONS    NAME:  Citlalli Mcmahon                                                          TODAY'S DATE:  3/10/2020    SURGERY DATE:  3/12/2020                                  SURGERY ARRIVAL TIME:   1130 TBV    1. Do NOT eat or drink anything, including candy or gum, after MIDNIGHT on 3/11/20 , unless you have specific instructions from your Surgeon or Anesthesia Provider to do so. 2. No smoking on the day of surgery. 3. No alcohol 24 hours prior to the day of surgery. 4. No recreational drugs for one week prior to the day of surgery. 5. Leave all valuables, including money/purse, at home. 6. Remove all jewelry, nail polish, makeup (including mascara); no lotions, powders, deodorant, or perfume/cologne/after shave. 7. Glasses/Contact lenses and Dentures may be worn to the hospital.  They will be removed prior to surgery. 8. Call your doctor if symptoms of a cold or illness develop within 24 ours prior to surgery. 9. AN ADULT MUST DRIVE YOU HOME AFTER OUTPATIENT SURGERY. 10. If you are having an OUTPATIENT procedure, please make arrangements for a responsible adult to be with you for 24 hours after your surgery. 11. If you are admitted to the hospital, you will be assigned to a bed after surgery is complete. Normally a family member will not be able to see you until you are in your assigned bed. 15. Family is encouraged to accompany you to the hospital.  We ask visitors in the treatment area to be limited to ONE person at a time to ensure patient privacy. EXCEPTIONS WILL BE MADE AS NEEDED. 15. Children under 12 are discouraged from entering the treatment area and need to be supervised by an adult when in the waiting room. Special Instructions:     Take these medications the morning of surgery with a sip of water:  LEVOTHYROXINE, PEPCID, STOP anticoagulants AT LEAST 1 WEEK PRIOR to your surgery or, follow other MD instructions:  Jeff Davison    Patient Prep:    shower with anti-bacterial soap    These surgical instructions were reviewed with PATIENT'S DAUGHTER during the PAT PHONE CALL. Directions: On the morning of surgery, please go to the [facility]. Enter the building from the [facility] entrance, 1st floor (next to the Emergency Room entrance). Take the elevator to the 2nd floor. Sign in at the Registration Desk.     If you have any questions and/or concerns, please do not hesitate to call:  (Prior to the day of surgery)  [facility] unit:  [phone]  (Day of surgery)  [facility] unit:  [phone]

## 2020-03-11 ENCOUNTER — ANESTHESIA EVENT (OUTPATIENT)
Dept: SURGERY | Age: 85
End: 2020-03-11
Payer: MEDICARE

## 2020-03-12 ENCOUNTER — ANESTHESIA (OUTPATIENT)
Dept: SURGERY | Age: 85
End: 2020-03-12
Payer: MEDICARE

## 2020-03-12 ENCOUNTER — HOSPITAL ENCOUNTER (OUTPATIENT)
Age: 85
Setting detail: OUTPATIENT SURGERY
Discharge: HOME | End: 2020-03-12
Attending: UROLOGY | Admitting: UROLOGY
Payer: MEDICARE

## 2020-03-12 VITALS
HEIGHT: 63 IN | DIASTOLIC BLOOD PRESSURE: 57 MMHG | TEMPERATURE: 97 F | SYSTOLIC BLOOD PRESSURE: 117 MMHG | OXYGEN SATURATION: 100 % | HEART RATE: 70 BPM | RESPIRATION RATE: 18 BRPM | BODY MASS INDEX: 22.96 KG/M2 | WEIGHT: 129.6 LBS

## 2020-03-12 PROCEDURE — 76060000031 HC ANESTHESIA FIRST 0.5 HR: Performed by: UROLOGY

## 2020-03-12 PROCEDURE — 77030018823 HC SLV COMPR VENO -B: Performed by: UROLOGY

## 2020-03-12 PROCEDURE — 74011250636 HC RX REV CODE- 250/636: Performed by: NURSE ANESTHETIST, CERTIFIED REGISTERED

## 2020-03-12 PROCEDURE — 77030018832 HC SOL IRR H20 ICUM -A: Performed by: UROLOGY

## 2020-03-12 PROCEDURE — 76210000021 HC REC RM PH II 0.5 TO 1 HR: Performed by: UROLOGY

## 2020-03-12 PROCEDURE — 74011000250 HC RX REV CODE- 250: Performed by: UROLOGY

## 2020-03-12 PROCEDURE — 74011250637 HC RX REV CODE- 250/637: Performed by: NURSE ANESTHETIST, CERTIFIED REGISTERED

## 2020-03-12 PROCEDURE — 76010000154 HC OR TIME FIRST 0.5 HR: Performed by: UROLOGY

## 2020-03-12 PROCEDURE — 77030013079 HC BLNKT BAIR HGGR 3M -A: Performed by: ANESTHESIOLOGY

## 2020-03-12 PROCEDURE — 74011250636 HC RX REV CODE- 250/636: Performed by: UROLOGY

## 2020-03-12 PROCEDURE — 74011000258 HC RX REV CODE- 258: Performed by: UROLOGY

## 2020-03-12 RX ORDER — LIDOCAINE HYDROCHLORIDE 10 MG/ML
0.1 INJECTION, SOLUTION EPIDURAL; INFILTRATION; INTRACAUDAL; PERINEURAL AS NEEDED
Status: DISCONTINUED | OUTPATIENT
Start: 2020-03-12 | End: 2020-03-12 | Stop reason: HOSPADM

## 2020-03-12 RX ORDER — PROPOFOL 10 MG/ML
INJECTION, EMULSION INTRAVENOUS AS NEEDED
Status: DISCONTINUED | OUTPATIENT
Start: 2020-03-12 | End: 2020-03-12 | Stop reason: HOSPADM

## 2020-03-12 RX ORDER — FENTANYL CITRATE 50 UG/ML
INJECTION, SOLUTION INTRAMUSCULAR; INTRAVENOUS AS NEEDED
Status: DISCONTINUED | OUTPATIENT
Start: 2020-03-12 | End: 2020-03-12 | Stop reason: HOSPADM

## 2020-03-12 RX ORDER — FAMOTIDINE 20 MG/1
20 TABLET, FILM COATED ORAL ONCE
Status: COMPLETED | OUTPATIENT
Start: 2020-03-12 | End: 2020-03-12

## 2020-03-12 RX ORDER — HYDRALAZINE HYDROCHLORIDE 20 MG/ML
INJECTION INTRAMUSCULAR; INTRAVENOUS AS NEEDED
Status: DISCONTINUED | OUTPATIENT
Start: 2020-03-12 | End: 2020-03-12 | Stop reason: HOSPADM

## 2020-03-12 RX ORDER — SODIUM CHLORIDE, SODIUM LACTATE, POTASSIUM CHLORIDE, CALCIUM CHLORIDE 600; 310; 30; 20 MG/100ML; MG/100ML; MG/100ML; MG/100ML
50 INJECTION, SOLUTION INTRAVENOUS CONTINUOUS
Status: DISCONTINUED | OUTPATIENT
Start: 2020-03-12 | End: 2020-03-12 | Stop reason: HOSPADM

## 2020-03-12 RX ADMIN — FENTANYL CITRATE 25 MCG: 50 INJECTION, SOLUTION INTRAMUSCULAR; INTRAVENOUS at 13:39

## 2020-03-12 RX ADMIN — PROPOFOL 10 MG: 10 INJECTION, EMULSION INTRAVENOUS at 13:38

## 2020-03-12 RX ADMIN — PROPOFOL 10 MG: 10 INJECTION, EMULSION INTRAVENOUS at 13:43

## 2020-03-12 RX ADMIN — PROPOFOL 30 MG: 10 INJECTION, EMULSION INTRAVENOUS at 13:24

## 2020-03-12 RX ADMIN — PROPOFOL 20 MG: 10 INJECTION, EMULSION INTRAVENOUS at 13:28

## 2020-03-12 RX ADMIN — FAMOTIDINE 20 MG: 20 TABLET ORAL at 13:02

## 2020-03-12 RX ADMIN — FENTANYL CITRATE 50 MCG: 50 INJECTION, SOLUTION INTRAMUSCULAR; INTRAVENOUS at 13:18

## 2020-03-12 RX ADMIN — PROPOFOL 30 MG: 10 INJECTION, EMULSION INTRAVENOUS at 13:26

## 2020-03-12 RX ADMIN — PROPOFOL 10 MG: 10 INJECTION, EMULSION INTRAVENOUS at 13:35

## 2020-03-12 RX ADMIN — SODIUM CHLORIDE, SODIUM LACTATE, POTASSIUM CHLORIDE, AND CALCIUM CHLORIDE 50 ML/HR: 600; 310; 30; 20 INJECTION, SOLUTION INTRAVENOUS at 13:04

## 2020-03-12 RX ADMIN — HYDRALAZINE HYDROCHLORIDE 10 MG: 20 INJECTION INTRAMUSCULAR; INTRAVENOUS at 13:29

## 2020-03-12 RX ADMIN — FENTANYL CITRATE 25 MCG: 50 INJECTION, SOLUTION INTRAMUSCULAR; INTRAVENOUS at 13:45

## 2020-03-12 RX ADMIN — GENTAMICIN SULFATE 271.6 MG: 40 INJECTION, SOLUTION INTRAMUSCULAR; INTRAVENOUS at 13:38

## 2020-03-12 NOTE — DISCHARGE INSTRUCTIONS
No special wound care required. You may resume a regular diet. You may resume your previously established suprapubic catheter care/use/exchanges. No new medications required. Patient Education        Cystoscopy: What to Expect at [address]    A cystoscopy is a procedure that lets a doctor look inside of the bladder and the urethra. The urethra is the tube that carries urine from the bladder to outside the body. The doctor uses a thin, lighted tool called a cystoscope. Your bladder is filled with fluid. This stretches the bladder so that your doctor can look closely at the inside of your bladder. After the cystoscopy, your urethra may be sore at first, and it may burn when you urinate for the first few days after the procedure. You may feel the need to urinate more often, and your urine may be pink. These symptoms should get better in 1 or 2 days. You will probably be able to go back to most of your usual activities in 1 or 2 days. This care sheet gives you a general idea about how long it will take for you to recover. But each person recovers at a different pace. Follow the steps below to get better as quickly as possible. How can you care for yourself at home? Activity    · Rest when you feel tired. Getting enough sleep will help you recover.     · Try to walk each day. Start by walking a little more than you did the day before. Bit by bit, increase the amount you walk. Walking boosts blood flow and helps prevent pneumonia and constipation.     · Avoid strenuous activities, such as bicycle riding, jogging, weight lifting, or aerobic exercise, until your doctor says it is okay.     · Ask your doctor when you can drive again.     · Most people are able to return to work within 1 or 2 days after the procedure.     · You may shower and take baths as usual.     · Ask your doctor when it is okay for you to have sex. Diet    · You can eat your normal diet.  If your stomach is upset, try bland, low-fat foods like plain rice, broiled chicken, toast, and yogurt.     · Drink plenty of fluids (unless your doctor tells you not to). Medicines    · Take pain medicines exactly as directed. ? If the doctor gave you a prescription medicine for pain, take it as prescribed. ? If you are not taking a prescription pain medicine, ask your doctor if you can take an over-the-counter medicine.     · If you think your pain medicine is making you sick to your stomach:  ? Take your medicine after meals (unless your doctor has told you not to). ? Ask your doctor for a different pain medicine.     · If your doctor prescribed antibiotics, take them as directed. Do not stop taking them just because you feel better. You need to take the full course of antibiotics. Follow-up care is a key part of your treatment and safety. Be sure to make and go to all appointments, and call your doctor if you are having problems. It's also a good idea to know your test results and keep a list of the medicines you take. When should you call for help? Call 911 anytime you think you may need emergency care. For example, call if:    · You passed out (lost consciousness).     · You have severe trouble breathing.     · You have sudden chest pain and shortness of breath, or you cough up blood.     · You have severe belly pain.    Call your doctor now or seek immediate medical care if:    · You are sick to your stomach or cannot keep fluids down.     · Your urine is still red or you see blood clots after you have urinated several times.     · You have trouble passing urine or stool, especially if you have pain or swelling in your lower belly.     · You have signs of a blood clot, such as:  ? Pain in your calf, back of the knee, thigh, or groin. ? Redness and swelling in your leg or groin.     · You develop a fever or severe chills.     · You have pain in your back just below your rib cage.  This is called flank pain.    Watch closely for changes in your health, and be sure to contact your doctor if:    · You have pain or burning when you urinate. A burning feeling is normal for a day or two after the test, but call if it does not get better.     · You have a frequent urge to urinate but can pass only small amounts of urine.     · Your urine is pink, red, or cloudy, or smells bad. It is normal for the urine to have a pinkish color for a few days after the test, but call if it does not get better. Where can you learn more? Go to http://curt-ramiro.info/  Enter C842 in the search box to learn more about \"Cystoscopy: What to Expect at Home. \"  Current as of: August 21, 2019Content Version: 12.4  © 6056-7072 [employer]. Care instructions adapted under license by [employer] (which disclaims liability or warranty for this information). If you have questions about a medical condition or this instruction, always ask your healthcare professional. [address] any warranty or liability for your use of this information.

## 2020-03-12 NOTE — ANESTHESIA POSTPROCEDURE EVALUATION
Procedure(s):  CYSTOSCOPY , bladder botox 200 units.     MAC, total IV anesthesia, general - backup    Anesthesia Post Evaluation      Multimodal analgesia: multimodal analgesia not used between 6 hours prior to anesthesia start to PACU discharge  Patient location during evaluation: bedside  Patient participation: complete - patient participated  Level of consciousness: sleepy but conscious  Pain score: 2  Pain management: adequate  Airway patency: patent  Anesthetic complications: no  Respiratory status: acceptable and room air  Hydration status: acceptable  Post anesthesia nausea and vomiting:  none      Vitals Value Taken Time   /57 3/12/2020  2:03 PM   Temp     Pulse 70 3/12/2020  2:03 PM   Resp 18 3/12/2020  2:03 PM   SpO2

## 2020-03-12 NOTE — OP NOTES
Operative Note    3/12/2020    Patient: Geoffrey Jennings               Sex: female             MRN: [id number]      YOB: 1932      Age:  80 y.o. Preoperative Diagnosis: r33.9  urinary retention    Postoperative Diagnosis:  r33.9  urinary retention    Surgeon: Surgeon(s) and Role:     Abner Juarez MD - Primary    Assistant: *Jarett Winkler    Anesthesia:  General    Indications for surgery: 79 yo female with a small capacity bladder and SPT with significant bladder spasms. Botox offered to increase capacity. She agreed and presents for surgery. Procedure:  Procedure(s):  CYSTOSCOPY , bladder botox 200 units     Procedure in Detail:   The patient was identified and surgical site verification was performed prior to obtaining consent. The patient was brought to the [facility]. Under adequate monitored anesthesia, the patient was positioned in dorsal lithotomy and prepped and draped as usual  A preoperative time out was performed addressing the anticipated surgical site, procedure, and safety precautions. Sequential compression stockings were applied. Prophylactic antibiotic (gent) was administered empirically. A cystoscopy was performed. The anterior urethra appeared normal. The bladder was inspected. There was no evidence of any bladder stones, tumors or lesions. The bladder was small and contracted with multiple trabeculations. 200 units of Botox were obtained. This was mixed with 10 cc of normal saline and then it was injected throughout the bladder in 10  sites. One  cc was injected at each site. The patient's bladder was drained. There was no evidence of any significant bleeding. She was awakened from anesthesia and taken to the recovery room in stable condition.       Estimated Blood Loss:  1mL                  Implants: * No implants in log *    Specimens: * No specimens in log *     Drains: SPT (not changed)           Complications:  None Counts: Sponge and needle counts were correct times two. Plan: Ms. Lucy Branch has an appointment to see me in one week  for early postop follow-up, at [address] [place] [address], [address] [place] [address]      DYANA Martinez MD, MPH  Urologic Reconstruction Fellow      I performed the above procedure and agree with the note  Diana Pedraza MD  [facility]  A Division of [facility] [place]

## 2020-03-12 NOTE — PERIOP NOTES
Pre-Op Summary    Pt arrived via car with family/friend and is oriented to time, place, person and situation. Patient with unsteady gait with none assistive devices. Visit Vitals  /63 (BP 1 Location: Left arm, BP Patient Position: Sitting)   Pulse 66   Temp 97 °F (36.1 °C)   Resp 18   Ht 5' 3\" (1.6 m)   Wt 58.8 kg (129 lb 9.6 oz)   SpO2 99%   BMI 22.96 kg/m²       Peripheral IV located on Left hand . Patients belongings are located with daughters. .    Patient's point of contact is Nissa Mcfarland  and their contact number is: [phone]. They will be in the waiting room. They are able to receive medication information. They will be their ride home.

## 2020-03-12 NOTE — ANESTHESIA PREPROCEDURE EVALUATION
Relevant Problems   No relevant active problems       Anesthetic History     Other anesthesia complications     Comments: Patient reports hx/o prolonged awakening in the past... Review of Systems / Medical History  Patient summary reviewed, nursing notes reviewed and pertinent labs reviewed    Pulmonary                   Neuro/Psych     seizures  CVA: no residual symptoms  TIA and psychiatric history    Comments:   + Hx/o Anxiety/Depression    Patient reports hx/o \"seizure\" in the remote past, never requiring medications and with no recurrences, etiology unclear. .. Cardiovascular    Hypertension  Valvular problems/murmurs: tricuspid insufficiency, pulmonic insufficiency and mitral insufficiency    CHF  Dysrhythmias : atrial fibrillation  Complex congenital heart defect, CAD and hyperlipidemia      Comments: + Hx/o ASD. S/P closure in 1994  + Pulmonary HTN   GI/Hepatic/Renal     GERD: well controlled          Comments:   + Hx/o UTIs Endo/Other      Hypothyroidism: well controlled  Arthritis     Other Findings   Comments: Active Problems    Problem Noted Date  Dyspnea 11/23/2017  Pneumonia due to organism 11/22/2017  Acute on chronic diastolic heart failure 82/50/9478  Catheter-associated urinary tract infection 11/18/2017  Enterococcus faecalis infection - UTI 11/18/2017  Congestive heart failure 11/14/2017  CHF (congestive heart failure) 11/14/2017  CHF exacerbation 11/14/2017  Gross hematuria 05/07/2017  Hematuria 05/07/2017  Stroke 09/04/2014  Ingrowing nail R med 1 12/09/2013  Arthritis    Heart defect, congenital    Overview:     ASD closed 7/25/94    Hyperthyroidism    Overview:     Thyroid Nodule S/P partial Thyroidectomy    Cerebral infarction    Overview:     1996, right side weakness    Arrhythmia    Mitral valve failure    Overview:     Mod MR/TR/WI    Cardiac Testing    Overview:     A. Card Cath 5/31/94 ASD, NOS CAD  B. Most Recent Nuc 5/12/03 EF 73%, No ischemia  C. Holter 12/08/03 AFib  D.  Most Recent Echo 12/6/10 EF 55-60%, Mod LAE/Mod-Severe AFIA, RVSP 44mmHg, Mod MR/TR/WA           Physical Exam    Airway  Mallampati: II  TM Distance: 4 - 6 cm  Neck ROM: normal range of motion   Mouth opening: Normal     Cardiovascular    Rhythm: irregular  Rate: normal    Murmur: Grade 2, Mitral area     Dental    Dentition: Lower partial plate and Caps/crowns     Pulmonary  Breath sounds clear to auscultation               Abdominal  GI exam deferred       Other Findings            Anesthetic Plan    ASA: 3  Anesthesia type: MAC, total IV anesthesia and general - backup          Induction: Intravenous  Anesthetic plan and risks discussed with: Patient

## 2020-03-12 NOTE — H&P
I am following the established plan for Dr. Kirsty Jimenez and Dr. Kirsty Jimenez is the supervising physician for this day.         ICD-10-CM ICD-9-CM     1. Chronic suprapubic catheter (HCC) Z93.59 V44.50 CHANGE OF BLADDER TUBE,SIMPLE         MA BEDSIDE DRAINAGE BAG   2. Urinary retention R33.9 788.20 CHANGE OF BLADDER TUBE,SIMPLE         MA BEDSIDE DRAINAGE BAG      Plan:   1. 16fr (Latex free) SPT exchanged today. 2. Continue SPT exchanges q3-4 weeks. 3. Surgery letter sent for repeat 200 units Botox with Dr. Ian Ramos post op, sooner if needed. All questions answered. Body mass index is 23.23 kg/m². Nicole Perdue Patient's BMI is within the normal parameters.     Subjective       Chief Complaint   Patient presents with    Urinary Retention       Pt is here to discuss getting back to Botox pt currrently has SPT       History of Present Illness: Evaristo Salazar is a 80 y.o.  female with a history of UUI, refractory OAB, and PFD managed with Bladder Botox injections (last 6/27/19) and SPT     Patient states she would like to proceed with Botox. Previous benefit. Continues SPT changes every 3-4 weeks. Asymptomatic for infection. Denies flank pain, gross hematuria, dysuria. No f/c/n/v.      Prior History  11/4/19: Accompanied by daughter today. Pt was seen at [facility]/30/19 for n/v, dizziness, generalized malaise, and malodorous urine. She was found to have E.coli on UCx. Discharged with Keflex. Presents today doing well. Maintains SPT. Continues to experience intermittent leakage around the SPT and bladder spasms. Denies flank pain, gross hematuria, dysuria and is asymptomatic for infection today. No f/c/n/v.      3/13/19: Patient has noted leakage around SPT site despite Botox injections. She is frustrated as this will soak her clothing and a foul odor. She discontinued [place] 2/2 dry mouth. Recalls trying Myrbetriq 50mg but cannot remember if it provided benefit.  Requesting home health to start performing SPT exchanges monthly. Denies flank pain, gross hematuria, dysuria and is asymptomatic for infection today. No f/c/n/v.      12/21/18: Patient here today c/o decreased urine output and urine leakage from her vagina. Continues Myrbetriq 50mg for her bladder spasms,however she is scheduled to have Botox injections. On UTI prophylaxis with Keflex 250mg daily and has irrigated 3 times since her last visit with Dr. Orion Chen. She also has itching in her vagina today. No f/c/n/v.      Patient was unable tolerate cystoscopy to deliver Botox injections. SPT changed today. To OR for cysto and bladder capacity measurement under anesthesia; goal is to consider BN closure.      11/16/18: Patient is here for a SPT change, and reports increased leak w/ occasional gross hematuria. However, she began using a belly bag when she is active and this has improved her QOL significantly. She has finished her UTI prophylaxis. No s/s of UTI today. No n/v/f/fc. Area of irritation and dermatomycosis on low abdomen has improved with Nystatin BID and fluconazole 150mg x3.      9/7/2018: Patient has been doing relatively well since last. No symptomatic UTIs. She c/o leak around SPT and a rash developing above site. She continues on Keflex prophylaxis and Myrbetriq 50mg. Bladder spasms under better control. No gross hematuria or flank pain. No n/v/f/c. Overall, doing well.      7/16/2018: Patient has been scheduled for bladder neck closure with martius flap and bladder Botox injections on 7/27/2018 due to persistent bothersome incontinence. However, today states that she is taking her Myrbetriq 50mg consistently and her incontinence per urethra has improved significantly. She is still having some leakage around SPT, but this is not as bothersome to her. Last month she was treated for a persistent UTI with two courses of ABX, and believes that this has also helped her bladder spasms.  She is wanting to post-pone her surgery and observe her symptoms. No dysuria or gross hematuria. No n/v/f/c. Still does not like leg bag.      6/27/2018: Patient has been seen in office several times over the last few months with SPT issues, bladder spasms, recurrent UTIs, and constant incontinence. She has been scheduled for Cysto, bladder neck closure w/ martius flap, and Bladder Botox injections 200 units on 7/27/2018. But, it has been discussed that she will have the option to move forward with a urinary diversion if no benefit with less invasive surgery (BN closure). She is still unsure of what option will be better for her, and has several questions. She has been having severe bladder spasms this week, and is leaking constantly through urethra. Patient states that her QOL is very limited, and she can't leave the house without soaking her clothing. She was given Macrobid earlier this week for suspected UTI. No n/v/f/c.      6/12/2018: Patient was last seen for an SPT change on 6/4/2018. Patient has had persistent incontinence around SPT site and urethra. She struggles with rashes around her abdomen, and she is unsure if this is macerated skin or allergies. She is considering alternative surgical options, and presents today with daughter to discuss. No s/s of UTI. Catheter is draining well, but she has noticed some blood clots in her leg bag. No n/v/f/c.      5/23/2018: Had Botox injection last on 3/30/18 and is pleased with results. Seen in office for irrigation on 5/16/18. Still experiencing leakage around SPT site and occasionally from urethra as well. Last changed on 5/7/18. Denies flank pain, gross hematuria, and is asymptomatic for infection today. No f/c/n/v.       3/30/18: Here today for 200u Botox injections. Pt was started on Levaquin 2 days ago for E coli UTI, but has been experiencing SE nausea. She was sent a prescription of Zofran by Dr. Mary Grace Leija, however she has not taken it. Concerned that she may still have infection today.  Has upcoming appointment with ID to discuss frequent UTI. No f/c/n/v.      3/2/2018: Patient was last treated for UTI in January. She had a \"horrible\" back ache, confusion, and malodorous urine. Symptoms have resolved following a course of Doxycyline. She is having some leakage around catheter and through urethra. No gross hematuria. No n/v/f/c.      11/29/2017: Patient was recently admitted to the hospital on 11/15/2017 for a CHF which was resolved with diuresis and e E faecalis CAUTI resolved by IV ABX. She was discharged home but was readmitted to hospital on 11/23/2017 for concern of pneumonia but was diagnosed with with acute bronchitis. Urine culture was negative during second admission. Her SPT is draining appropriately. Yellow urine in leg bag. Given recent CHF diagnosis she has been placed on lasix and has noticed increase incontinence around catheter at night. Continues  irrigations MWF. No n/v/f/c.      5/31/2017: Here today s/p cysto, 200 units of bladder Botox injections, and SPT placement on 5/2/2017. Postoperative course complicated by gross hematuria requiring readmittance for CBI. She was also seen in the emergency room for concern of minor cellulitis around SPT site and was placed on Keflex. Patient continues to c/o persistent erythema around SPT site and states that she was missing doses of her ABX. She denies gross hematuria. No n/v/f/c.      2/17/2017: Presents today in follow up. She is s/p bladder Botox injections on 7/20/2016 and manages with a chronic Rodriguez catheter. She continues on Myrbetriq 50mg daily for bladder spasms, but feels that Botox and Myrbetriq have become less effective recently and she feels that it is time to schedule next course of Botox. She has been experiencing increased leakage around catheter. She is managing leakage well with pads, but bladder spasms are very painful. No hematuria. No n/v/f/c.    Additionally mentions that she is no longer performing bladder irrigations MWF because her daughter has not been able to help her. Interested in [facility] if she qualifies.      11/18/2016: Presents today in follow up. She is s/p bladder Botox injections on 7/20/2016 and manages with a chronic Rodriguez catheter. She continues on Myrbetriq 50 mg/ bladder spasms. She has been treated for 2 UTI's since last visit. With infections she experiences fransisco catheter leakage and cloudy urine. Cloudy urine improved after course of ABx, but still struggling with fransisco cath leakage. She would like to discuss UTI management. Denies hematuria, flank pain, or AMS.    8/29/2016:She is s/p bladder Botox injections on 7/20/2016. She continues management with Rodriguez catheter (last changed on 8/16/16) and is taking Toviaz 8mg daily. She reports intolerable dry mouth with White  would like to discuss Myrbetriq. Still has leakage per urethra around catheter with occasional bladder spasms, but improved with Botox. Reports 2 episodes. She is plugging Rodriguez and emptying bladder q 1 hour. Currently begin treated for UTI. Last dose of Macrobid tonight. No n/v/f/c.      5/16/2016 Patient is considering Botox injecitons, however, she is unable to preform CIC 2/2 arthritis. Leakage is still very bothersome. Continues to wear 5-6 PPD. She is considering a permanent Rodriguez to control leakage, specifically, in social situations. Denies UTI sx today.      4/11/2016: Since ladst visit she has finished a trial of Myrbetriq 50mg with minimal benefit. She reports significant dry mouth and \"sores\". Her UUI is significantly effecting QOL. She is interested in more invasive therapies; mainly interested in bladder Botox injections. She has never performed CIC before but is willing to learn in event of retention. No dysuria, hematuria, or recent UTI. She did PTNS with Dr. Ivette Mcgowan in the past.   History of a-fib; on anticoagulation.  Has been able to bridge for dental procedures in the past.      2/23/2016:Estephanie Guillaume is a 80 y.o. female  who presents for follow up of UUI. Patient has completed 6 sessions of PT. Her constipation issues resolved, however she has not experienced any improvement from a urinary stand point. Still uses up 6 pads a day which are completely soaked. She is no longer interested in continuing with physical therapy and would like to try something else.      11/24/2015:Estephanie Fuentes is a 80 y.o. female  who presents for follow up of severe UUI. On last visit she was given samples of myrbetriq, which she stopped because it raised her blood pressure. UDS examination revealed a low capacity compliant bladder with DO and unrelaxed sphincter. On PT she was found to have pelvic floor muscle dysfunction. She is still having frequent incontinence episodes, especially during the night, and complains of severe constipation.       11/2/2015: She has not been evaluated since 2013. Since last visit she has been seeing Dr. Tonya Matos, urogynocology and recently underwent cystoscopy as part of a work up for interstim placement. Her cystoscopy was revealed highly trabeculated bladder and several cellules. She would like to discuss this today. She states that she \"never\" makes it to the bathroom with initial urge. She wears several heavy pads throughout the day and night. She has constant bladder pain with voiding. She voids with continuous, good FOS. Frequently experiences PVR sensation and will double void with crede or valsalva. She notes enuresis until she was a teenager and dyspareunia when she was sexually active. She has also h/o diverticulitis and has trouble with regulating bowel movements. She will have constipation and then experience diarrhea after taking laxitives. During episodes of diarrhea she will experience occasional fecal incontinence. Has failed PTNS in the past.      8/16/2013:Has finished PT and had significant improvement in symptoms. She had relief in suprapubic pressure and voids better. Happy with results.  Here for follow up. Had vaginal itching with estrace, hence she discontinue.      2013:  history of frequency every 1-2 hours, urgency, UUI (wears a pad); occasional ROBERTO. Also complains of nocturia X 3-4. She complains of a suprapubic pressure that relieves after voiding. The symptoms have been present for about two months, but had episodes in the past. Intensity has progressed since onset. Episodes interpreted as UTI's and managed with different antibiotics; has tried 3 types with partial success in relieving symptoms. Good FOS, feels empty after finishing, no hesitancy. No dysuria, no hematuria. She drinks 80 ounces of water, one coffee in the morning and a soda at night. Irregular bowel movement, uses [employer] to help with constipation.    A1 (tubed ectopic pregnancy) vaginal delivery; no hysterectomy.      Review of Systems  Constitutional: Fever: No  Skin: Rash: No  HEENT: Hearing difficulty: No  Eyes: Blurred vision: No  Cardiovascular: Chest pain: No  Respiratory: Shortness of breath: No  Gastrointestinal: Nausea/vomiting: No  Musculoskeletal: Back pain: No  Neurological: Weakness: No  Psychological: Memory loss: No  Comments/additional findings:           Past Medical History:   Diagnosis Date    A-fib (HCC)      Arthritis      B12 deficiency      Bladder atony      Bladder spasms      CAD (coronary artery disease)      Cardiac arrhythmia      Cardiac disorder      Cerebral artery occlusion with cerebral infarction (HCC)      Cerebral infarction (HCC)      Chronic suprapubic catheter (HCC)      Congestive heart failure (CHF) ([facility] [address] 2017    Diverticulum of bladder      Esophageal reflux      Rodriguez catheter in place 2017    Frequency of micturition      GERD (gastroesophageal reflux disease)      Gross hematuria      Heart defect, congenital      Hematuria      HTN (hypertension)      Hyperlipemia      Hyperthyroidism      Hypothyroid      Incontinence of feces      Memory loss      OAB (overactive bladder)      Osteopenia      PFD (pelvic floor dysfunction)      Psychiatric disorder       depression, anxiety    Recurrent UTI      Stroke (HCC)      Suprapubic pressure      Urinary incontinence       unspecified type    Urine retention      UTI (urinary tract infection)      Vaginal atrophy      Vitamin D deficiency              Past Surgical History:   Procedure Laterality Date    HX CATARACT REMOVAL   9/13, 10/13    HX DILATION AND CURETTAGE        HX OTHER SURGICAL   1996     septal defect repair    HX PARTIAL THYROIDECTOMY        HX UROLOGICAL   05/02/2017     CYSTOURETHROSCOPY W/ CHEMODENERVATION BLADDER INJECTION (N/A) Procedure: CYSTOURETHROSCOPY WITH INJECTION FOR CHEMODENERVATION OF THE BLADDER; Surgeon: Joyce Rosenberg MD     UROLOGICAL   05/02/2017     BLADDER ASPIRATION (N/A) Procedure: BLADDER ASPIRATION WITH INSERTION OF SUPRAPUBIC CATHETER; Surgeon: Joyce Rosenberg MD     UROLOGICAL   01/29/2019     Cysto, measurement of bladder capacity, Botox injections into bladder 100 units       HI CYSTOURETHROSCOPY   05/02/2017     Blad. Deshaun Shepherd      Social History            Socioeconomic History    Marital status:        Spouse name: Not on file    Number of children: Not on file    Years of education: Not on file    Highest education level: Not on file   Occupational History    Not on file   Social Needs    Financial resource strain: Not on file    Food insecurity:       Worry: Not on file       Inability: Not on file    Transportation needs:       Medical: Not on file       Non-medical: Not on file   Tobacco Use    Smoking status: Never Smoker    Smokeless tobacco: Never Used   Substance and Sexual Activity    Alcohol use:  Yes       Comment: socially wine or beer    Drug use: No    Sexual activity: Not Currently       Partners: Male   Lifestyle    Physical activity:       Days per week: Not on file       Minutes per session: Not on file    Stress: Not on file   Relationships    Social connections:       Talks on phone: Not on file       Gets together: Not on file       Attends Nondenominational service: Not on file       Active member of club or organization: Not on file       Attends meetings of clubs or organizations: Not on file       Relationship status: Not on file    Intimate partner violence:       Fear of current or ex partner: Not on file       Emotionally abused: Not on file       Physically abused: Not on file       Forced sexual activity: Not on file   Other Topics Concern    Not on file   Social History Narrative    Not on file            Family History   Problem Relation Age of Onset    Diabetes Mother      Arthritis-rheumatoid Sister      Diabetes Sister               Current Outpatient Medications on File Prior to Visit   Medication Sig Dispense Refill    mirabegron ER (MYRBETRIQ) 50 mg ER tablet Take 1 Tab by mouth daily. 30 Tab 4    ondansetron (ZOFRAN ODT) 4 mg disintegrating tablet dissolve 1 tablet ON TONGUE every 8 hours if needed   0    TYLENOL EXTRA STRENGTH 500 mg tablet          sodium chloride irrigation 0.9 % irrigation irrigate WITH 1000ml FOR one DOSE   11    atorvastatin (LIPITOR) 10 mg tablet Take 10 mg by mouth daily.        metoprolol succinate (TOPROL-XL) 100 mg tablet Take 100 mg by mouth nightly.   0    furosemide (LASIX) 20 mg tablet Take 20 mg by mouth every other day.   0    famotidine (PEPCID) 20 mg tablet Take 20 mg by mouth daily.   0    Denosumab (PROLIA) 60 mg/mL injection 60 mg.        rivaroxaban (XARELTO) 15 mg tab tablet Take  by mouth daily.        levothyroxine (SYNTHROID) 25 mcg tablet Take  by mouth Daily (before breakfast).         ergocalciferol (VITAMIN D2) 50,000 unit capsule Take 50,000 Units by mouth every seven (7) days.          No current facility-administered medications on file prior to visit.             Allergies   Allergen Reactions    Latex Rash    Aspirin Other (comments)       Does not take because she is on other blood thinners    Ciprofloxacin Other (comments)       Nausea, vomiting, sores in mouth    Codeine Nausea and Vomiting       Patient can not remember    Penicillins Other (comments)    Pradaxa [Dabigatran Etexilate] Other (comments)       Breaks out sores    Sulfa (Sulfonamide Antibiotics) Nausea Only    Trospium Nausea and Vomiting and Other (comments)       Dry mouth and sores in the mouth            Objective  Visit Vitals  /76   Ht 5' 2\" (1.575 m)   Wt 127 lb (57.6 kg)   BMI 23.23 kg/m²      Constitutional: Well developed, well-nourished female in no acute distress. Abdomen: SPT site clean and dry. Area of dermatomycosis above SPT orifice has improved. SPT changed without difficulty.            Results for orders placed or performed in visit on 12/05/19   URINE C&S   Result Value Ref Range     FINAL REPORT Microbiology results (A)        No results found for this visit on 02/11/20.     CT A/P W CONT 10/30/19  KIDNEYS/URETERS/BLADDER: There is a suprapubic catheter in place. The bladder is incompletely distended. No hydronephrosis or nephrolithiasis. PELVIC ORGANS: Unremarkable.     CC: Tal Stephenson PA-C     I was present and have independently reviewed the diagnosis and discussed the plan with Lowell Davis MD, MPH Urologic Reconstruction Fellow , and I agree. Kady Mott MD   [facility]  [address] [place] [address].  Serg Azevedo

## 2020-10-19 PROBLEM — R60.0 EDEMA OF LOWER EXTREMITY: Status: ACTIVE | Noted: 2020-07-20

## 2020-10-19 PROBLEM — I51.7 CARDIOMEGALY: Status: ACTIVE | Noted: 2020-07-20

## 2020-10-19 PROBLEM — R41.3 MEMORY IMPAIRMENT: Status: ACTIVE | Noted: 2020-07-20

## 2020-10-19 PROBLEM — I77.6 VASCULITIS (HCC): Status: ACTIVE | Noted: 2020-07-20

## 2020-10-19 PROBLEM — I83.90 VARICOSE VEINS OF LOWER EXTREMITY: Status: ACTIVE | Noted: 2020-08-10

## 2020-10-19 PROBLEM — R47.01 APHASIA: Status: ACTIVE | Noted: 2020-05-23

## 2021-08-03 PROBLEM — I50.9 CONGESTIVE HEART FAILURE (CHF) (HCC): Status: RESOLVED | Noted: 2021-08-03 | Resolved: 2021-08-03

## 2021-08-03 PROBLEM — I48.91 A-FIB (HCC): Status: RESOLVED | Noted: 2021-08-03 | Resolved: 2021-08-03

## (undated) DEVICE — DRAPE PERC PROC W335XL196CM LINGEMAN

## (undated) DEVICE — SPONGE GZ W4XL4IN COT 12 PLY TYP VII WVN C FLD DSGN

## (undated) DEVICE — SOLUTION IRRIGATION H2O 0797305] ICU MEDICAL INC]

## (undated) DEVICE — 1016 S-DRAPE IRRIG POUCH 10/BOX: Brand: STERI-DRAPE™

## (undated) DEVICE — SYRINGE MED 3ML NDL 22GA L1.5IN PLAS N CTRL LUERLOCK TIP

## (undated) DEVICE — STERILE POLYISOPRENE POWDER-FREE SURGICAL GLOVES: Brand: PROTEXIS

## (undated) DEVICE — PACK,LITHOTOMY,PK I: Brand: MEDLINE

## (undated) DEVICE — SOLUTION IRRIG 3000ML H2O STRL BAG

## (undated) DEVICE — NDL PRT INJ NSAF BLNT 18GX1.5 --

## (undated) DEVICE — Device

## (undated) DEVICE — CATHETER URETH 16FR BLLN 5CC SIL HYDRGEL 2 W F LUBRICIOUS

## (undated) DEVICE — STERILE LATEX POWDER-FREE SURGICAL GLOVESWITH NITRILE COATING: Brand: PROTEXIS

## (undated) DEVICE — CONTROL SYRINGE LUER-LOCK TIP: Brand: MONOJECT